# Patient Record
(demographics unavailable — no encounter records)

---

## 2025-04-16 NOTE — ASSESSMENT
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  Pt without any GYN complaints at today.  # endometrioid endometrial carcinoma -Clinically CARISSA -RTC in 12mo for surveillance or PRN  #HCM - colonoscopy 4/2024 wnl - next due in 5-10 years - f/u mammogram - to be performed next week - f/u with her PCP in July 2025  RTC in 12mo or PRN Seen and d/w Dr. Reagan and Dr. Goldberg Nitasha Gupta, PGY-3

## 2025-04-16 NOTE — PHYSICAL EXAM
[Restricted in physically strenuous activity but ambulatory and able to carry out work of a light or sedentary nature] : Status 1- Restricted in physically strenuous activity but ambulatory and able to carry out work of a light or sedentary nature, e.g., light house work, office work [Absent] : CVAT: absent [Normal] : no adenopathy noted in inguinal lymph nodes

## 2025-04-16 NOTE — HISTORY OF PRESENT ILLNESS
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 Pt last seen on 9/4/24 CARISSA at that time. Reports her mood symptoms from her last visit have gotten better.  Denies SI or desire to hurt herself/others.  Pt denies abdominal pain, n/v, inability to tolerate PO, diarrhea, constipation, fevers, chills, dysuria, vaginal bleeding. Pt had a colonoscopy on 4/2/2024 that showed evidence of diverticulosis, with recommendation for repeat colonoscopy in 5-10 years. Also clarified patient's hx of breast biopsies - states she had a BIRADS-3 mammo with subsequent breast biopsies in 12/2023, which were reportedly benign, and patient was instructed to f/u for routine annual mammo screening per her PCP.  Pt has not had a mammogram since her negative breast biopsies, but states she has one scheduled next week and has an appointment with her PCP in July 2025.  She follows with Dr. Amari Elmore of [facility].